# Patient Record
Sex: MALE | Race: BLACK OR AFRICAN AMERICAN | NOT HISPANIC OR LATINO | URBAN - METROPOLITAN AREA
[De-identification: names, ages, dates, MRNs, and addresses within clinical notes are randomized per-mention and may not be internally consistent; named-entity substitution may affect disease eponyms.]

---

## 2020-06-01 ENCOUNTER — TELEPHONE ENCOUNTER (OUTPATIENT)
Dept: URBAN - METROPOLITAN AREA CLINIC 35 | Facility: CLINIC | Age: 75
End: 2020-06-01

## 2020-06-02 ENCOUNTER — OFFICE VISIT (OUTPATIENT)
Dept: URBAN - METROPOLITAN AREA MEDICAL CENTER 10 | Facility: MEDICAL CENTER | Age: 75
End: 2020-06-02

## 2020-06-03 ENCOUNTER — TELEPHONE ENCOUNTER (OUTPATIENT)
Dept: URBAN - METROPOLITAN AREA CLINIC 35 | Facility: CLINIC | Age: 75
End: 2020-06-03

## 2020-06-09 ENCOUNTER — OFFICE VISIT (OUTPATIENT)
Dept: URBAN - METROPOLITAN AREA MEDICAL CENTER 10 | Facility: MEDICAL CENTER | Age: 75
End: 2020-06-09

## 2020-06-16 ENCOUNTER — OFFICE VISIT (OUTPATIENT)
Dept: URBAN - METROPOLITAN AREA CLINIC 35 | Facility: CLINIC | Age: 75
End: 2020-06-16

## 2020-06-23 ENCOUNTER — OFFICE VISIT (OUTPATIENT)
Dept: URBAN - METROPOLITAN AREA CLINIC 35 | Facility: CLINIC | Age: 75
End: 2020-06-23

## 2020-06-23 VITALS — WEIGHT: 260 LBS | HEIGHT: 69 IN | BODY MASS INDEX: 38.51 KG/M2

## 2020-06-23 RX ORDER — PANTOPRAZOLE SODIUM 40 MG/1
1 TABLET TABLET, DELAYED RELEASE ORAL
Qty: 30 | Refills: 2 | Status: ACTIVE | COMMUNITY
Start: 2020-05-20

## 2020-06-23 RX ORDER — LISINOPRIL 2.5 MG/1
1 TABLET TABLET ORAL ONCE A DAY
Qty: 30 | Status: ACTIVE | COMMUNITY

## 2020-06-23 RX ORDER — ATORVASTATIN CALCIUM 10 MG/1
1 TABLET TABLET, FILM COATED ORAL ONCE A DAY
Qty: 30 | Status: ACTIVE | COMMUNITY

## 2020-06-23 RX ORDER — ASPIRIN 81 MG/1
1 TABLET TABLET, COATED ORAL ONCE A DAY
Qty: 30 | Status: ON HOLD | COMMUNITY

## 2020-06-23 NOTE — HPI-MIGRATED HPI
;     Gastroesophageal reflux disease : Patient presents today for follow up to his EGD via Televisit and consent has been obtained. Patient saw us initially for atypical CP. Since the procedure patient denies dysphagia, globus, changes in appetite, and changes in bowel habits.  Patient admits improvement with Pantoprazole 40 mg daily. He admits 1 episode of sour taste in his mouth.  Few days after starting Pantoprazole had episode of CP but none since. Patient really feels much better.  EGD and path report are documented below and reviewed with patient.    Last visit (5/20/2020) 75 year old male presents for consult of atypical chest pain via Tele visit and consent has been obtained.   Patient admits intermittent episodes of steady centered chest pain that started 6 months ago. Pain is short lived (seconds) and radiates to his back, with painful breathing. He admits this usually occurs in the evening or in the am.  Frequency will be 1-2 times a week Most recent episode 1 week ago was very severe and lasting longer, few minutes.   Patient admits frequent eructations and intermittent episodes of sour taste in his mouth. No heartburn. No dysphagia. No abdominal pain. BM's: 2 BM's a day but stool is hard (not new). No blood in stool and no melena.  He denies nausea, early satiety or weight loss  Patient has tried Tums with relief of symptoms. He takes one Tums in the am and 1 in the evening.   Patient admits [facility] ER visit on 5/16/2020 for chest pain and nausea, night sweats. The course/duration of symptoms is constant.  Location: [facility] substernal.  The character of symptoms is sharp and stabbing. Patient admits having EKG with normal results, and x-ray with normal results. He denies being discharged with any medication.   Patient denies previous EGD, or Barium Swallow.   No abdominal pain. No weight loss No CP, SOB or fever. No blood thinners. No sleep apnea. ;

## 2020-08-03 ENCOUNTER — OFFICE VISIT (OUTPATIENT)
Dept: URBAN - METROPOLITAN AREA CLINIC 35 | Facility: CLINIC | Age: 75
End: 2020-08-03

## 2020-08-03 VITALS
BODY MASS INDEX: 41.32 KG/M2 | WEIGHT: 279 LBS | SYSTOLIC BLOOD PRESSURE: 138 MMHG | TEMPERATURE: 99 F | DIASTOLIC BLOOD PRESSURE: 80 MMHG | HEIGHT: 69 IN

## 2020-08-03 RX ORDER — PANTOPRAZOLE SODIUM 40 MG/1
1 TABLET TABLET, DELAYED RELEASE ORAL
Qty: 30 | Refills: 2 | Status: ACTIVE | COMMUNITY
Start: 2020-05-20

## 2020-08-03 RX ORDER — LISINOPRIL 2.5 MG/1
1 TABLET TABLET ORAL ONCE A DAY
Qty: 30 | Status: ACTIVE | COMMUNITY

## 2020-08-03 RX ORDER — ASPIRIN 81 MG/1
1 TABLET TABLET, COATED ORAL ONCE A DAY
Qty: 30 | Status: ACTIVE | COMMUNITY

## 2020-08-03 RX ORDER — ATORVASTATIN CALCIUM 10 MG/1
1 TABLET TABLET, FILM COATED ORAL ONCE A DAY
Qty: 30 | Status: ACTIVE | COMMUNITY

## 2020-08-03 RX ORDER — PANTOPRAZOLE SODIUM 40 MG/1
1 TABLET TABLET, DELAYED RELEASE ORAL
Qty: 30 | Refills: 0 | OUTPATIENT
Start: 2020-08-03

## 2020-08-03 NOTE — HPI-MIGRATED HPI
;     Gastroesophageal reflux disease : Patient presents today for follow up of atypical chest pain. He admits 1 episode of chest pain 8/1/2020 that occurred between 6-7 pm. He had consumed 2 chili dogs around 5 pm. The pain was epigastric and radiated to the left and around to his back. This lasted for 3 seconds. After that he was fine. He denies any other symptoms at that time.   He is currently taking Pantoprazole 40 mg daily.   No heartburn, indigestion or dysphagia. No abdominal pain.  NL BM's 2 times a day. No blood in stool and no melena.  Last visit (6/23/2020) Patient presents today for follow up to his EGD via Tele visit and consent has been obtained. Patient saw us initially for atypical CP. Since the procedure patient denies dysphagia, globus, changes in appetite, and changes in bowel habits.  Patient admits improvement with Pantoprazole 40 mg daily. He admits 1 episode of sour taste in his mouth.  Few days after starting Pantoprazole had episode of CP but none since. Patient really feels much better.  EGD and path report are documented below and reviewed with patient.    Last visit (5/20/2020) 75 year old male presents for consult of atypical chest pain via Tele visit and consent has been obtained.   Patient admits intermittent episodes of steady centered chest pain that started 6 months ago. Pain is short lived (seconds) and radiates to his back, with painful breathing. He admits this usually occurs in the evening or in the am.  Frequency will be 1-2 times a week Most recent episode 1 week ago was very severe and lasting longer, few minutes.   Patient admits frequent eructations and intermittent episodes of sour taste in his mouth. No heartburn. No dysphagia. No abdominal pain. BM's: 2 BM's a day but stool is hard (not new). No blood in stool and no melena.  He denies nausea, early satiety or weight loss  Patient has tried Tums with relief of symptoms. He takes one Tums in the am and 1 in the evening.   Patient admits [facility] ER visit on 5/16/2020 for chest pain and nausea, night sweats. The course/duration of symptoms is constant.  Location: Central substernal.  The character of symptoms is sharp and stabbing. Patient admits having EKG with normal results, and x-ray with normal results. He denies being discharged with any medication.   Patient denies previous EGD, or Barium Swallow.   No abdominal pain. No weight loss No CP, SOB or fever. No blood thinners. No sleep apnea. ;

## 2020-08-03 NOTE — EXAM-MIGRATED EXAMINATIONS
GENERAL APPEARANCE: - alert, in no acute distress, well developed, well nourished;   EYES: - sclera anicteric bilaterally;   HEART: - no murmurs, regular rate and rhythm, S1, S2 normal;   LUNGS: - clear to auscultation bilaterally, good air movement, no wheezes, rales, rhonchi;   ABDOMEN: -  soft, nontender, nondistended;

## 2020-08-27 ENCOUNTER — TELEPHONE ENCOUNTER (OUTPATIENT)
Dept: URBAN - METROPOLITAN AREA CLINIC 35 | Facility: CLINIC | Age: 75
End: 2020-08-27

## 2020-10-05 ENCOUNTER — OFFICE VISIT (OUTPATIENT)
Dept: URBAN - METROPOLITAN AREA CLINIC 35 | Facility: CLINIC | Age: 75
End: 2020-10-05

## 2020-10-05 VITALS — WEIGHT: 283 LBS | BODY MASS INDEX: 41.92 KG/M2 | HEIGHT: 69 IN

## 2020-10-05 RX ORDER — PANTOPRAZOLE SODIUM 40 MG/1
1 TABLET TABLET, DELAYED RELEASE ORAL
Qty: 30 | Refills: 2 | Status: ACTIVE | COMMUNITY
Start: 2020-05-20

## 2020-10-05 RX ORDER — PANTOPRAZOLE SODIUM 20 MG/1
1 TABLET TABLET, DELAYED RELEASE ORAL
Qty: 90 | Refills: 1 | OUTPATIENT
Start: 2020-10-05

## 2020-10-05 RX ORDER — ASPIRIN 81 MG/1
1 TABLET TABLET, COATED ORAL ONCE A DAY
Qty: 30 | Status: ACTIVE | COMMUNITY

## 2020-10-05 RX ORDER — LISINOPRIL 10 MG/1
1 TABLET TABLET ORAL ONCE A DAY
Qty: 30 | Status: ACTIVE | COMMUNITY

## 2020-10-05 RX ORDER — ATORVASTATIN CALCIUM 10 MG/1
1 TABLET TABLET, FILM COATED ORAL ONCE A DAY
Qty: 30 | Status: ACTIVE | COMMUNITY

## 2020-10-05 RX ORDER — LISINOPRIL 2.5 MG/1
1 TABLET TABLET ORAL ONCE A DAY
Qty: 30 | Status: ACTIVE | COMMUNITY

## 2020-10-05 RX ORDER — PANTOPRAZOLE SODIUM 40 MG/1
1 TABLET TABLET, DELAYED RELEASE ORAL
Qty: 30 | Refills: 0 | Status: DISCONTINUED | COMMUNITY
Start: 2020-08-03

## 2020-10-05 NOTE — HPI-MIGRATED HPI
;     Gastroesophageal reflux disease : Patient presents today for a follow up of GERD. He denies any associated symptoms since his last visit. He reports that his symptoms are controlled with the use of Pantoprazole  40 mg.   Patient had a RUQ U/S completed and has been made aware of the results.  Patient admits to having a colonoscopy at [place] within the last two years.  mm TA at hepatic flexure, 3 HP's in rectum, IH.  Currently he reports  1-2 BM's a  day His stools are normal formed without blood, mucus, melena, pruritus ani, or rectal pain.   Admits to onset constipation as of last week stool were hard and clumped together. He denies taking any OTC medication for relief.    GI MD Note: 2 episodes of CP since last visit but short lasting and not as severe except the third episode 3 days ago that lasted 1-2 minutes  Patient having 2 BM's a day but stools are very hard.  US RUQ as documented below: not clear if indeed has a small GS. His symptoms though are not the typical ones for symptomatic cholelithiasis.    Last visit (8/3/2020) Patient presents today for follow up of atypical chest pain. He admits 1 episode of chest pain 8/1/2020 that occurred between 6-7 pm. He had consumed 2 chili dogs around 5 pm. The pain was epigastric and radiated to the left and around to his back. This lasted for 3 seconds. After that he was fine. He denies any other symptoms at that time.   He is currently taking Pantoprazole 40 mg daily.   No heartburn, indigestion or dysphagia. No abdominal pain.  NL BM's 2 times a day. No blood in stool and no melena.  Last visit (6/23/2020) Patient presents today for follow up to his EGD via Tele visit and consent has been obtained. Patient saw us initially for atypical CP. Since the procedure patient denies dysphagia, globus, changes in appetite, and changes in bowel habits.  Patient admits improvement with Pantoprazole 40 mg daily. He admits 1 episode of sour taste in his mouth.  Few days after starting Pantoprazole had episode of CP but none since. Patient really feels much better.  EGD and path report are documented below and reviewed with patient.    Last visit (5/20/2020) 75 year old male presents for consult of atypical chest pain via Tele visit and consent has been obtained.   Patient admits intermittent episodes of steady centered chest pain that started 6 months ago. Pain is short lived (seconds) and radiates to his back, with painful breathing. He admits this usually occurs in the evening or in the am.  Frequency will be 1-2 times a week Most recent episode 1 week ago was very severe and lasting longer, few minutes.   Patient admits frequent eructations and intermittent episodes of sour taste in his mouth. No heartburn. No dysphagia. No abdominal pain. BM's: 2 BM's a day but stool is hard (not new). No blood in stool and no melena.  He denies nausea, early satiety or weight loss  Patient has tried Tums with relief of symptoms. He takes one Tums in the am and 1 in the evening.   Patient admits [facility] ER visit on 5/16/2020 for chest pain and nausea, night sweats. The course/duration of symptoms is constant.  Location: Central substernal.  The character of symptoms is sharp and stabbing. Patient admits having EKG with normal results, and x-ray with normal results. He denies being discharged with any medication.   Patient denies previous EGD, or Barium Swallow.   No abdominal pain. No weight loss No CP, SOB or fever. No blood thinners. No sleep apnea. ;

## 2021-04-05 ENCOUNTER — DASHBOARD ENCOUNTERS (OUTPATIENT)
Age: 76
End: 2021-04-05

## 2021-04-06 ENCOUNTER — OFFICE VISIT (OUTPATIENT)
Dept: URBAN - METROPOLITAN AREA CLINIC 35 | Facility: CLINIC | Age: 76
End: 2021-04-06

## 2021-04-06 VITALS
HEIGHT: 69 IN | OXYGEN SATURATION: 95 % | WEIGHT: 280 LBS | SYSTOLIC BLOOD PRESSURE: 130 MMHG | DIASTOLIC BLOOD PRESSURE: 80 MMHG | BODY MASS INDEX: 41.47 KG/M2 | HEART RATE: 54 BPM

## 2021-04-06 PROBLEM — 266435005: Status: ACTIVE | Noted: 2020-08-03

## 2021-04-06 PROBLEM — 14760008: Status: ACTIVE | Noted: 2020-10-05

## 2021-04-06 PROBLEM — 197321007: Status: ACTIVE | Noted: 2020-10-05

## 2021-04-06 RX ORDER — PANTOPRAZOLE SODIUM 40 MG/1
1 TABLET TABLET, DELAYED RELEASE ORAL
Qty: 30 | Refills: 2 | Status: ON HOLD | COMMUNITY
Start: 2020-05-20

## 2021-04-06 RX ORDER — LISINOPRIL 10 MG/1
1 TABLET TABLET ORAL ONCE A DAY
Qty: 30 | Status: ON HOLD | COMMUNITY

## 2021-04-06 RX ORDER — ASPIRIN 81 MG/1
1 TABLET TABLET, COATED ORAL ONCE A DAY
Qty: 30 | Status: ACTIVE | COMMUNITY

## 2021-04-06 RX ORDER — LISINOPRIL 20 MG/1
1 TABLET TABLET ORAL ONCE A DAY
Status: ACTIVE | COMMUNITY

## 2021-04-06 RX ORDER — ATORVASTATIN CALCIUM 10 MG/1
1 TABLET TABLET, FILM COATED ORAL ONCE A DAY
Qty: 30 | Status: ACTIVE | COMMUNITY

## 2021-04-06 RX ORDER — PANTOPRAZOLE SODIUM 20 MG/1
1 TABLET TABLET, DELAYED RELEASE ORAL
Qty: 90 | Refills: 1 | Status: ACTIVE | COMMUNITY
Start: 2020-10-05

## 2021-04-06 NOTE — HPI-MIGRATED HPI
;   ;     Chest pain : Patient present today for his 6 month follow up of atypical chest pain. At last visit Pantoprazole was decreased to 20 mg. He denies any episodes of chest pain, no longer have labored breathing. His last episode of chest pain is when he had a big dinner about an hour before laying down. He admits sour taste in the morning.   GI MD Note: no heartburn. No N/V. No dysphagia. No HH seen on last EGD. No changes of Mosqueda's . Esophagus looked NL.  Last visit (10/5/2020) Patient presents today for a follow up of GERD. He denies any associated symptoms since his last visit. He reports that his symptoms are controlled with the use of Pantoprazole  40 mg.   Patient had a RUQ U/S completed and has been made aware of the results.  Patient admits to having a colonoscopy at [place] within the last two years.  mm TA at hepatic flexure, 3 HP's in rectum, IH.  Currently he reports  1-2 BM's a  day His stools are normal formed without blood, mucus, melena, pruritus ani, or rectal pain.   Admits to onset constipation as of last week stool were hard and clumped together. He denies taking any OTC medication for relief.    GI MD Note: 2 episodes of CP since last visit but short lasting and not as severe except the third episode 3 days ago that lasted 1-2 minutes  Patient having 2 BM's a day but stools are very hard.  US RUQ as documented below: not clear if indeed has a small GS. His symptoms though are not the typical ones for symptomatic cholelithiasis. ;   Constipation : Patient admits improvement of constipation with the use of Metamucil and Miralax prn. He is having 1 large strenuous BM every 3-4 days, with hard stool only when he eats a heavy meal, otherwise he has good BM. No melena, blood, or mucus. He will then take Metamucil and Miralax he will have a regular soft stool.   No bloating, abdominal pain/cramps.;